# Patient Record
Sex: FEMALE | Race: BLACK OR AFRICAN AMERICAN | NOT HISPANIC OR LATINO
[De-identification: names, ages, dates, MRNs, and addresses within clinical notes are randomized per-mention and may not be internally consistent; named-entity substitution may affect disease eponyms.]

---

## 2021-05-12 ENCOUNTER — TRANSCRIPTION ENCOUNTER (OUTPATIENT)
Age: 51
End: 2021-05-12

## 2021-11-27 ENCOUNTER — TRANSCRIPTION ENCOUNTER (OUTPATIENT)
Age: 51
End: 2021-11-27

## 2023-06-21 ENCOUNTER — NON-APPOINTMENT (OUTPATIENT)
Age: 53
End: 2023-06-21

## 2023-06-28 ENCOUNTER — APPOINTMENT (OUTPATIENT)
Dept: OTOLARYNGOLOGY | Facility: CLINIC | Age: 53
End: 2023-06-28
Payer: COMMERCIAL

## 2023-06-28 VITALS
WEIGHT: 160 LBS | SYSTOLIC BLOOD PRESSURE: 155 MMHG | DIASTOLIC BLOOD PRESSURE: 92 MMHG | HEART RATE: 88 BPM | TEMPERATURE: 97.7 F | OXYGEN SATURATION: 100 % | BODY MASS INDEX: 26.66 KG/M2 | HEIGHT: 65 IN

## 2023-06-28 DIAGNOSIS — R19.6 HALITOSIS: ICD-10-CM

## 2023-06-28 DIAGNOSIS — J35.8 OTHER CHRONIC DISEASES OF TONSILS AND ADENOIDS: ICD-10-CM

## 2023-06-28 PROCEDURE — 99203 OFFICE O/P NEW LOW 30 MIN: CPT

## 2023-06-28 NOTE — HISTORY OF PRESENT ILLNESS
[de-identified] : 53 years old female patient with history of Persistent  halitosis for over 6 years.  Patient is present today in the office with C/O persistent Halitosis.  Right side cryptic tonsil.  No tonsil stones. Status post  LAIT

## 2023-06-28 NOTE — REASON FOR VISIT
[Subsequent Evaluation] : a subsequent evaluation for [FreeTextEntry2] : Persistent  halitosis for over 6 years.  Status post  LAIT

## 2023-06-28 NOTE — PHYSICAL EXAM
[] : septum deviated bilaterally [Normal] : no abnormal secretions [de-identified] : posterior biofilm [de-identified] : cryptic tonsillitis bilat

## 2023-06-28 NOTE — REVIEW OF SYSTEMS
[Patient Intake Form Reviewed] : Patient intake form was reviewed [As Noted in HPI] : as noted in HPI [Problem Snoring] : problem snoring [Negative] : Heme/Lymph

## 2023-07-18 ENCOUNTER — APPOINTMENT (OUTPATIENT)
Dept: OTOLARYNGOLOGY | Facility: CLINIC | Age: 53
End: 2023-07-18
Payer: SELF-PAY

## 2023-07-18 VITALS
HEIGHT: 65 IN | SYSTOLIC BLOOD PRESSURE: 133 MMHG | OXYGEN SATURATION: 99 % | WEIGHT: 160 LBS | DIASTOLIC BLOOD PRESSURE: 80 MMHG | BODY MASS INDEX: 26.66 KG/M2 | HEART RATE: 76 BPM | TEMPERATURE: 97.7 F

## 2023-07-18 DIAGNOSIS — K14.3 HYPERTROPHY OF TONGUE PAPILLAE: ICD-10-CM

## 2023-07-18 DIAGNOSIS — R19.6 HALITOSIS: ICD-10-CM

## 2023-07-18 DIAGNOSIS — J35.8 OTHER CHRONIC DISEASES OF TONSILS AND ADENOIDS: ICD-10-CM

## 2023-07-18 PROCEDURE — 41599A: CUSTOM

## 2023-07-18 NOTE — PHYSICAL EXAM
[] : septum deviated bilaterally [Normal] : no rashes [de-identified] : posterior biofilm [de-identified] : cryptic tonsillitis bilat

## 2023-07-18 NOTE — REASON FOR VISIT
[Subsequent Evaluation] : a subsequent evaluation for [FreeTextEntry2] : Persistent  halitosis.  Laser assisted  removal of  tongue biofilm and  decreased halitosis

## 2023-07-18 NOTE — PROCEDURE
[Normal] : normal [FreeTextEntry1] :  Laser assisted  removal of  tongue biofilm and  decreased halitosis ( Waterlase ) [FreeTextEntry3] :  Laser assisted  removal of  tongue biofilm and  decreased halitosis ( Waterlase )

## 2023-07-18 NOTE — HISTORY OF PRESENT ILLNESS
[de-identified] : 53 years old female patient with history of Persistent  halitosis for over 6 years.  Patient is present today in the office with C/O persistent Halitosis.   Laser assisted  removal of  tongue biofilm and  decreased halitosis

## 2023-07-18 NOTE — REVIEW OF SYSTEMS
[Patient Intake Form Reviewed] : Patient intake form was reviewed [As Noted in HPI] : as noted in HPI [Negative] : Heme/Lymph [Problem Snoring] : no snoring problems [de-identified] : Tongue biofilm